# Patient Record
Sex: MALE | Race: WHITE | NOT HISPANIC OR LATINO | Employment: FULL TIME | URBAN - METROPOLITAN AREA
[De-identification: names, ages, dates, MRNs, and addresses within clinical notes are randomized per-mention and may not be internally consistent; named-entity substitution may affect disease eponyms.]

---

## 2018-06-19 ENCOUNTER — OFFICE VISIT (OUTPATIENT)
Dept: FAMILY MEDICINE | Facility: CLINIC | Age: 58
End: 2018-06-19
Payer: COMMERCIAL

## 2018-06-19 VITALS
OXYGEN SATURATION: 97 % | RESPIRATION RATE: 17 BRPM | HEIGHT: 67 IN | SYSTOLIC BLOOD PRESSURE: 130 MMHG | WEIGHT: 173.31 LBS | HEART RATE: 60 BPM | DIASTOLIC BLOOD PRESSURE: 76 MMHG | BODY MASS INDEX: 27.2 KG/M2 | TEMPERATURE: 99 F

## 2018-06-19 DIAGNOSIS — D22.9 NUMEROUS MOLES: ICD-10-CM
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
## 2018-06-19 PROCEDURE — 99999 PR PBB SHADOW E&M-NEW PATIENT-LVL III: CPT | Mod: PBBFAC,,, | Performed by: INTERNAL MEDICINE

## 2018-06-19 PROCEDURE — 99203 OFFICE O/P NEW LOW 30 MIN: CPT | Mod: S$GLB,,, | Performed by: INTERNAL MEDICINE
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
## 2018-06-20 ENCOUNTER — TELEPHONE (OUTPATIENT)
Dept: FAMILY MEDICINE | Facility: CLINIC | Age: 58
End: 2018-06-20

## 2018-06-20 NOTE — TELEPHONE ENCOUNTER
----- Message from Bibiana Ramey sent at 6/20/2018 10:04 AM CDT -----  Contact: Self  Pt states he was seen on yesterday and his neck pain has gotten worse. Pt can be reached @ [phone].
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
## 2018-06-21 ENCOUNTER — TELEPHONE (OUTPATIENT)
Dept: FAMILY MEDICINE | Facility: CLINIC | Age: 58
End: 2018-06-21

## 2018-06-21 ENCOUNTER — HOSPITAL ENCOUNTER (OUTPATIENT)
Dept: RADIOLOGY | Facility: HOSPITAL | Age: 58
Discharge: HOME | End: 2018-06-21
Attending: NURSE PRACTITIONER
Payer: COMMERCIAL

## 2018-06-21 ENCOUNTER — OFFICE VISIT (OUTPATIENT)
Dept: FAMILY MEDICINE | Facility: CLINIC | Age: 58
End: 2018-06-21
Payer: COMMERCIAL

## 2018-06-21 VITALS
DIASTOLIC BLOOD PRESSURE: 62 MMHG | OXYGEN SATURATION: 97 % | WEIGHT: 173 LBS | HEART RATE: 80 BPM | SYSTOLIC BLOOD PRESSURE: 102 MMHG | BODY MASS INDEX: 27.15 KG/M2 | TEMPERATURE: 98 F | HEIGHT: 67 IN

## 2018-06-21 DIAGNOSIS — M62.838 CERVICAL PARASPINAL MUSCLE SPASM: ICD-10-CM
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
## 2018-06-21 DIAGNOSIS — S16.1XXD STRAIN OF NECK MUSCLE, SUBSEQUENT ENCOUNTER: Primary | ICD-10-CM

## 2018-06-21 DIAGNOSIS — S16.1XXD STRAIN OF NECK MUSCLE, SUBSEQUENT ENCOUNTER: ICD-10-CM

## 2018-06-21 PROCEDURE — 3008F BODY MASS INDEX DOCD: CPT | Mod: CPTII,S$GLB,, | Performed by: NURSE PRACTITIONER

## 2018-06-21 PROCEDURE — 72040 X-RAY EXAM NECK SPINE 2-3 VW: CPT | Mod: TC,FY,PO

## 2018-06-21 PROCEDURE — 99999 PR PBB SHADOW E&M-EST. PATIENT-LVL IV: CPT | Mod: PBBFAC,,, | Performed by: NURSE PRACTITIONER

## 2018-06-21 PROCEDURE — 99214 OFFICE O/P EST MOD 30 MIN: CPT | Mod: S$GLB,,, | Performed by: NURSE PRACTITIONER

## 2018-06-21 PROCEDURE — 72040 X-RAY EXAM NECK SPINE 2-3 VW: CPT | Mod: 26,,, | Performed by: RADIOLOGY

## 2018-06-21 RX ORDER — CYCLOBENZAPRINE HCL 5 MG
5 TABLET ORAL NIGHTLY
Qty: 15 TABLET | Refills: 0 | Status: SHIPPED | OUTPATIENT
Start: 2018-06-21 | End: 2018-07-01

## 2018-06-21 NOTE — TELEPHONE ENCOUNTER
Patient in clinic for x-ray requesting a work note for 6/20-6/22 as he forgot to request it during his OV.  He is also requesting that the note state he is on light duty.  Patient would like to be notified when the note is ready for pickup.  Please advise.
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
## 2018-06-21 NOTE — LETTER
June 22, 2018      [facility]  [address] [facility]  [place] [id number]  Phone: [phone]  Fax: [phone]       Patient: Ashwin Segura   YOB: 1960  Date of Visit: 06/21/2018    Please excuse 6/20/2018-6/22/2018    To Whom It May Concern:    Caden Segura  was at [facility] on 06/21/2018. He may return to work/school on 06/25/2018 with restrictions, light duty. If you have any questions or concerns, or if I can be of further assistance, please do not hesitate to contact me.    Sincerely,      Bre Hamilton NP

## 2018-06-21 NOTE — PATIENT INSTRUCTIONS
Neck Spasm     A spasm of the neck muscles can happen after a sudden awkward neck movement. Sleeping with your neck in a crooked position can also cause spasm. Some people respond to emotional stress by tensing the muscles of their neck, shoulders, and upper back. If neck spasm lasts long enough, it can cause headache.  The treatment described below will usually help the pain to go away in 5 to 7 days. Pain that continues may need further evaluation or other types of treatment such as physical therapy.  Home care  · Rest and relax the muscles. Use a comfortable pillow that supports the head and keeps the spine in a neutral position. The position of the head should not be tilted forward or backward. A rolled up towel may help for a custom fit.  · Some people find relief with heat. Heat can be applied with either a warm shower or bath or a moist towel heated in the microwave and massage. Others prefer cold packs. You can make an ice pack by filling a plastic bag that seals at the top with ice cubes or crushed ice and then wrapping it with a thin towel. Try both and use the method that feels best for 15 to 20 minutes, several times a day.  · Whether using ice or heat, be careful that you do not injure your skin. Never put ice directly on the skin. Always wrap the ice in a towel or other type of cloth. This is very important, especially in people with poor skin sensations.  · Try to reduce your stress level. Emotional stress can lead to neck muscle tension and get in the way of or delay the healing process.  · You may use over-the-counter pain medicine to control pain, unless another medicine was prescribed.If you have chronic liver or kidney disease or ever had a stomach ulcer or GI bleeding, talk with your healthcare provider before using these medicines.  Follow-up care  Follow up with your healthcare provider if your symptoms do not show signs of improvement after one week. Physical therapy or further tests may be  needed.  If X-rays, CT scans, or MRI scans were taken, you will be told of any new findings that may affect your care.  Call 911  Call 911 if you have:  · Sudden weakness or numbness in one or both arms  · Neck swelling, difficulty or painful swallowing  · Difficulty breathing  · Chest pain  When to seek medical advice  Call your healthcare provider right away if any of these occur:  · Pain becomes worse or spreads into one or both arms  · Increasing headache with nausea or vomiting  · Fever of 100.4°F (38°C) or above lasting for 24 to 48 hours  Date Last Reviewed: 11/21/2015  © 5480-3918 [employer]. [address] [place] [address], [place] [address]. All rights reserved. This information is not intended as a substitute for professional medical care. Always follow your healthcare professional's instructions.

## 2018-06-22 DIAGNOSIS — Z11.59 NEED FOR HEPATITIS C SCREENING TEST: ICD-10-CM

## 2018-06-22 DIAGNOSIS — Z12.11 COLON CANCER SCREENING: ICD-10-CM

## 2018-06-22 NOTE — TELEPHONE ENCOUNTER
Also, please let the patient know the x-ray does not show an acute fracture or dislocation of the bones in the neck. It does show chronic degenerative changes.    Thanks!  GELY HaleC

## 2018-06-27 ENCOUNTER — TELEPHONE (OUTPATIENT)
Dept: ADMINISTRATIVE | Facility: HOSPITAL | Age: 58
End: 2018-06-27

## 2018-07-02 ENCOUNTER — OFFICE VISIT (OUTPATIENT)
Dept: FAMILY MEDICINE | Facility: CLINIC | Age: 58
End: 2018-07-02
Payer: COMMERCIAL

## 2018-07-02 VITALS
DIASTOLIC BLOOD PRESSURE: 70 MMHG | OXYGEN SATURATION: 96 % | WEIGHT: 169.75 LBS | SYSTOLIC BLOOD PRESSURE: 130 MMHG | HEIGHT: 67 IN | TEMPERATURE: 99 F | BODY MASS INDEX: 26.64 KG/M2 | RESPIRATION RATE: 17 BRPM | HEART RATE: 62 BPM

## 2018-07-02 DIAGNOSIS — K64.9 HEMORRHOIDS, UNSPECIFIED HEMORRHOID TYPE: ICD-10-CM

## 2018-07-02 DIAGNOSIS — M62.838 CERVICAL PARASPINAL MUSCLE SPASM: Primary | ICD-10-CM

## 2018-07-02 PROCEDURE — 99999 PR PBB SHADOW E&M-EST. PATIENT-LVL IV: CPT | Mod: PBBFAC,,, | Performed by: INTERNAL MEDICINE

## 2018-07-02 PROCEDURE — 99213 OFFICE O/P EST LOW 20 MIN: CPT | Mod: 25,S$GLB,, | Performed by: INTERNAL MEDICINE

## 2018-07-02 PROCEDURE — 96372 THER/PROPH/DIAG INJ SC/IM: CPT | Mod: S$GLB,,, | Performed by: INTERNAL MEDICINE

## 2018-07-02 PROCEDURE — 3008F BODY MASS INDEX DOCD: CPT | Mod: CPTII,S$GLB,, | Performed by: INTERNAL MEDICINE

## 2018-07-02 RX ORDER — SILDENAFIL CITRATE 20 MG/1
TABLET ORAL
Refills: 2 | COMMUNITY
Start: 2018-06-26 | End: 2022-04-08

## 2018-07-02 RX ORDER — LIDOCAINE 50 MG/G
OINTMENT TOPICAL
Qty: 30 G | Refills: 0 | Status: SHIPPED | OUTPATIENT
Start: 2018-07-02

## 2018-07-02 RX ORDER — KETOROLAC TROMETHAMINE 30 MG/ML
30 INJECTION, SOLUTION INTRAMUSCULAR; INTRAVENOUS
Status: COMPLETED | OUTPATIENT
Start: 2018-07-02 | End: 2018-07-02

## 2018-07-02 RX ORDER — IBUPROFEN 200 MG
200 TABLET ORAL EVERY 6 HOURS PRN
COMMUNITY
End: 2018-08-13

## 2018-07-02 RX ORDER — HYDROCORTISONE 25 MG/G
CREAM TOPICAL 2 TIMES DAILY
Qty: 20 G | Refills: 0 | Status: SHIPPED | OUTPATIENT
Start: 2018-07-02 | End: 2022-04-08

## 2018-07-02 RX ORDER — TRIAMCINOLONE ACETONIDE 40 MG/ML
40 INJECTION, SUSPENSION INTRA-ARTICULAR; INTRAMUSCULAR
Status: COMPLETED | OUTPATIENT
Start: 2018-07-02 | End: 2018-07-02

## 2018-07-02 RX ADMIN — TRIAMCINOLONE ACETONIDE 40 MG: 40 INJECTION, SUSPENSION INTRA-ARTICULAR; INTRAMUSCULAR at 04:07

## 2018-07-02 RX ADMIN — KETOROLAC TROMETHAMINE 30 MG: 30 INJECTION, SOLUTION INTRAMUSCULAR; INTRAVENOUS at 04:07

## 2018-07-02 NOTE — PROGRESS NOTES
HISTORY OF PRESENT ILLNESS:  Ashwin Segura Jr. is a 57 y.o. male who presents to the clinic today for Neck Pain    While working bending head down, pain in the back of neck into shoulder/upper back area.  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  Has been back at the gym but does stretching.  Denies numbness into fingers or hands, no weakness.    Hemorrhoids - ongoing for some time; denies constipation.  Noticed increased irritation recently, occasional blood streaks with stool.    PAST MEDICAL HISTORY:  History reviewed. No pertinent past medical history.    PAST SURGICAL HISTORY:  Past Surgical History:   Procedure Laterality Date    APPENDECTOMY         SOCIAL HISTORY:  Social History     Social History    Marital status:      Spouse name: N/A    Number of children: N/A    Years of education: N/A     Occupational History    Not on file.     Social History Main Topics    Smoking status: Never Smoker    Smokeless tobacco: Not on file    Alcohol use Not on file    Drug use: Unknown    Sexual activity: Not on file     Other Topics Concern    Not on file     Social History Narrative    No narrative on file       FAMILY HISTORY:  Family History   Problem Relation Age of Onset    Cancer Mother     Heart disease Father        ALLERGIES AND MEDICATIONS: updated and reviewed.  Review of patient's allergies indicates:  No Known Allergies  Medication List with Changes/Refills   Current Medications    IBUPROFEN (ADVIL,MOTRIN) 200 MG TABLET    Take 200 mg by mouth every 6 (six) hours as needed for Pain.    SILDENAFIL (REVATIO) 20 MG TAB    TAKE 1 TO 3 TABLETS BY MOUTH ONE HOUR PRIOR TO RELATIONS          CARE TEAM:  Patient Care Team:  Ronan Gan MD as PCP - General (Internal Medicine)         REVIEW OF SYSTEMS:  Review of Systems   Constitutional: Negative for chills and fatigue.   Respiratory: Negative for cough and shortness of breath.    Gastrointestinal: Positive for blood in  "stool. Negative for abdominal pain and constipation.   Musculoskeletal: Positive for neck pain and neck stiffness.   Neurological: Negative for weakness and numbness.         PHYSICAL EXAM:   Vitals:    07/02/18 1538   BP: 130/70   Pulse: 62   Resp: 17   Temp: 99.2 °F (37.3 °C)     Weight: 77 kg (169 lb 12.1 oz)   Height: 5' 7" (170.2 cm)   Body mass index is 26.59 kg/m².     General appearance - alert, well appearing, and in no distress  Mental status - normal mood, behavior, speech, dress, motor activity, and thought processes  Neck - supple, no significant adenopathy  Chest - clear to auscultation, no wheezes, rales or rhonchi, symmetric air entry  Heart - normal rate and regular rhythm  Abdomen - soft, nontender, nondistended, no masses or organomegaly  no rebound tenderness noted  Musculoskeletal - no joint tenderness, deformity or swelling, no muscular tenderness noted, full range of motion without pain      ASSESSMENT AND PLAN:  1. Cervical paraspinal muscle spasm  - ketorolac injection 30 mg; Inject 1 mL (30 mg total) into the muscle one time.  - triamcinolone acetonide injection 40 mg; Inject 1 mL (40 mg total) into the muscle one time.  - Ambulatory Referral to Physical/Occupational Therapy    2. Hemorrhoids, unspecified hemorrhoid type  - hydrocortisone 2.5 % cream; Apply topically 2 (two) times daily. for 10 days  Dispense: 20 g; Refill: 0  - lidocaine (XYLOCAINE) 5 % Oint ointment; Apply topically as needed.  Dispense: 30 g; Refill: 0  - Ambulatory referral to General Surgery     Follow up as needed.  "

## 2018-07-02 NOTE — PROGRESS NOTES
Patient tolerate Kenalog 40 mg and Toradol 30 mg IM injection. Patient advise to wait 15 min after injection  for assessment of any posssible side effects.

## 2018-07-03 ENCOUNTER — TELEPHONE (OUTPATIENT)
Dept: FAMILY MEDICINE | Facility: CLINIC | Age: 58
End: 2018-07-03

## 2018-07-03 NOTE — TELEPHONE ENCOUNTER
----- Message from Rachelle Benton sent at 7/3/2018  9:11 AM CDT -----  Contact: [facility]  Rep calling to clarify directions on --- lidocaine (XYLOCAINE) 5 % Oint ointment---   [phone]

## 2018-07-03 NOTE — TELEPHONE ENCOUNTER
Spoke with Jesus Reyes with clarity of directions for the lidocaine after speaking with Dr Gan and it to be applied twice daily as needed.

## 2018-07-09 ENCOUNTER — TELEPHONE (OUTPATIENT)
Dept: FAMILY MEDICINE | Facility: CLINIC | Age: 58
End: 2018-07-09

## 2018-07-09 NOTE — TELEPHONE ENCOUNTER
Attempted to contact pt. Regarding an appointment for neck pain. No ans. Left message on VM to call office.

## 2018-07-09 NOTE — TELEPHONE ENCOUNTER
----- Message from Humaira Lloyd sent at 7/9/2018  9:02 AM CDT -----  Contact: self  Patient is still having neck pain and would like an appointment but theres no openings. Please call back at [phone]

## 2018-07-10 ENCOUNTER — TELEPHONE (OUTPATIENT)
Dept: FAMILY MEDICINE | Facility: CLINIC | Age: 58
End: 2018-07-10

## 2018-07-10 NOTE — TELEPHONE ENCOUNTER
----- Message from Sid Call sent at 7/10/2018 10:47 AM CDT -----  Contact: Ashwin [phone]  Patient is retunring a call back to the staff in regards to an MRI. He is having problems with his neck. Please call at your earliest convenience.
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
## 2018-07-11 NOTE — TELEPHONE ENCOUNTER
Spoke with patient and he informed me that on his last visit with provider; she informed him if he was having any something mention in his office visit to give a return call for a possible MRI being done. Patient said that today he had pain in neck and headache on a pain scale-5. He said that he went back to work and with work activities his pain is a 8 and it from head, neck, to right shoulder. Please advise. Patient was informed that provider will be out of clinic until 7/16/18.

## 2018-07-12 ENCOUNTER — TELEPHONE (OUTPATIENT)
Dept: FAMILY MEDICINE | Facility: CLINIC | Age: 58
End: 2018-07-12

## 2018-07-12 NOTE — TELEPHONE ENCOUNTER
----- Message from Carmen Strong sent at 7/12/2018  3:43 PM CDT -----  Contact: Self   Patient is asking the office to give him a call. [phone].

## 2018-07-12 NOTE — TELEPHONE ENCOUNTER
Talked to pt regarding pain in neck. Pt was advised that if he's still having (8 pain level) he should seek Urgent care or ED. Pt refused and said pain level is now at a 4 or 5, but he must return to work soon and wants MRI done. Pt. Also stated that he talked to Dr. Gan during his OV and his understanding was that Dr. Gan would submit an referral if he was still having pain.

## 2018-07-13 ENCOUNTER — HOSPITAL ENCOUNTER (OUTPATIENT)
Dept: RADIOLOGY | Facility: HOSPITAL | Age: 58
Discharge: HOME | End: 2018-07-13
Attending: INTERNAL MEDICINE
Payer: COMMERCIAL

## 2018-07-13 DIAGNOSIS — M54.2 NECK PAIN: Primary | ICD-10-CM

## 2018-07-13 DIAGNOSIS — M54.2 NECK PAIN: ICD-10-CM

## 2018-07-13 PROCEDURE — 72141 MRI NECK SPINE W/O DYE: CPT | Mod: TC

## 2018-07-13 PROCEDURE — 72141 MRI NECK SPINE W/O DYE: CPT | Mod: 26,,, | Performed by: RADIOLOGY

## 2018-07-16 ENCOUNTER — OFFICE VISIT (OUTPATIENT)
Dept: FAMILY MEDICINE | Facility: CLINIC | Age: 58
End: 2018-07-16
Payer: COMMERCIAL

## 2018-07-16 VITALS
DIASTOLIC BLOOD PRESSURE: 90 MMHG | BODY MASS INDEX: 26.59 KG/M2 | HEART RATE: 72 BPM | TEMPERATURE: 99 F | OXYGEN SATURATION: 95 % | SYSTOLIC BLOOD PRESSURE: 140 MMHG | WEIGHT: 169.75 LBS

## 2018-07-16 DIAGNOSIS — M54.2 NECK PAIN: Primary | ICD-10-CM

## 2018-07-16 PROCEDURE — 99213 OFFICE O/P EST LOW 20 MIN: CPT | Mod: S$GLB,,, | Performed by: INTERNAL MEDICINE

## 2018-07-16 PROCEDURE — 99999 PR PBB SHADOW E&M-EST. PATIENT-LVL III: CPT | Mod: PBBFAC,,, | Performed by: INTERNAL MEDICINE

## 2018-07-16 PROCEDURE — 3008F BODY MASS INDEX DOCD: CPT | Mod: CPTII,S$GLB,, | Performed by: INTERNAL MEDICINE

## 2018-07-16 RX ORDER — TIZANIDINE 2 MG/1
2 TABLET ORAL EVERY 8 HOURS PRN
Qty: 30 TABLET | Refills: 0 | Status: SHIPPED | OUTPATIENT
Start: 2018-07-16 | End: 2018-07-26

## 2018-07-16 RX ORDER — MELOXICAM 7.5 MG/1
7.5 TABLET ORAL DAILY
Qty: 15 TABLET | Refills: 0 | Status: SHIPPED | OUTPATIENT
Start: 2018-07-16 | End: 2022-04-08
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
## 2018-07-17 NOTE — TELEPHONE ENCOUNTER
Attempted to contact pt. Regarding MRI. Dr. Gan has set the Order for MRI and wants pt. To set up a follow up visit after MRI is completed. No answer left message on VM to call office.

## 2018-07-20 ENCOUNTER — TELEPHONE (OUTPATIENT)
Dept: ENDOCRINOLOGY | Facility: CLINIC | Age: 58
End: 2018-07-20

## 2018-07-20 ENCOUNTER — TELEPHONE (OUTPATIENT)
Dept: PAIN MEDICINE | Facility: CLINIC | Age: 58
End: 2018-07-20

## 2018-07-20 NOTE — TELEPHONE ENCOUNTER
Reminded patient of Pain Management appointment scheduled for Monday at 11.30a with Dr. Mendoza- verbal confirmation received.  Location information also provided.

## 2018-08-06 ENCOUNTER — CLINICAL SUPPORT (OUTPATIENT)
Dept: REHABILITATION | Facility: OTHER | Age: 58
End: 2018-08-06
Payer: COMMERCIAL

## 2018-08-06 DIAGNOSIS — M62.89 MUSCLE TIGHTNESS: ICD-10-CM

## 2018-08-06 DIAGNOSIS — M54.2 NECK PAIN ON LEFT SIDE: ICD-10-CM

## 2018-08-06 DIAGNOSIS — R29.3 POSTURAL IMBALANCE: ICD-10-CM

## 2018-08-06 PROCEDURE — 97161 PT EVAL LOW COMPLEX 20 MIN: CPT | Mod: PN

## 2018-08-06 PROCEDURE — 97110 THERAPEUTIC EXERCISES: CPT | Mod: PN

## 2018-08-06 PROCEDURE — 97140 MANUAL THERAPY 1/> REGIONS: CPT | Mod: PN
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
## 2018-08-13 ENCOUNTER — OFFICE VISIT (OUTPATIENT)
Dept: URGENT CARE | Facility: CLINIC | Age: 58
End: 2018-08-13
Payer: COMMERCIAL

## 2018-08-13 VITALS
SYSTOLIC BLOOD PRESSURE: 140 MMHG | BODY MASS INDEX: 26.53 KG/M2 | WEIGHT: 169 LBS | RESPIRATION RATE: 18 BRPM | HEART RATE: 66 BPM | DIASTOLIC BLOOD PRESSURE: 82 MMHG | HEIGHT: 67 IN | OXYGEN SATURATION: 98 % | TEMPERATURE: 98 F

## 2018-08-13 DIAGNOSIS — S61.211A LACERATION OF LEFT INDEX FINGER WITHOUT FOREIGN BODY WITHOUT DAMAGE TO NAIL, INITIAL ENCOUNTER: Primary | ICD-10-CM

## 2018-08-13 PROCEDURE — 90715 TDAP VACCINE 7 YRS/> IM: CPT | Mod: S$GLB,,, | Performed by: FAMILY MEDICINE

## 2018-08-13 PROCEDURE — 99203 OFFICE O/P NEW LOW 30 MIN: CPT | Mod: 25,S$GLB,, | Performed by: FAMILY MEDICINE

## 2018-08-13 PROCEDURE — 90471 IMMUNIZATION ADMIN: CPT | Mod: S$GLB,,, | Performed by: FAMILY MEDICINE

## 2018-08-13 PROCEDURE — 12042 INTMD RPR N-HF/GENIT2.6-7.5: CPT | Mod: S$GLB,,, | Performed by: FAMILY MEDICINE

## 2018-08-13 RX ORDER — IBUPROFEN 800 MG/1
800 TABLET ORAL EVERY 8 HOURS PRN
Qty: 60 TABLET | Refills: 2 | Status: SHIPPED | OUTPATIENT
Start: 2018-08-13 | End: 2019-08-13

## 2018-08-13 RX ORDER — CEPHALEXIN 500 MG/1
500 CAPSULE ORAL EVERY 8 HOURS
Qty: 30 CAPSULE | Refills: 0 | Status: SHIPPED | OUTPATIENT
Start: 2018-08-13 | End: 2018-08-23

## 2018-08-13 RX ORDER — MUPIROCIN 20 MG/G
OINTMENT TOPICAL
Qty: 22 G | Refills: 1 | Status: SHIPPED | OUTPATIENT
Start: 2018-08-13 | End: 2020-08-24

## 2018-08-13 RX ORDER — OXYCODONE AND ACETAMINOPHEN 5; 325 MG/1; MG/1
1 TABLET ORAL EVERY 12 HOURS PRN
Qty: 7 TABLET | Refills: 0 | Status: SHIPPED | OUTPATIENT
Start: 2018-08-13 | End: 2022-04-08

## 2018-08-13 NOTE — PROGRESS NOTES
"Subjective:       Patient ID: Ashwin Segura Jr. is a 57 y.o. male.    Vitals:  height is 5' 7" (1.702 m) and weight is 76.7 kg (169 lb). His temperature is 98.4 °F (36.9 °C). His blood pressure is 140/82 (abnormal) and his pulse is 66. His respiration is 18 and oxygen saturation is 98%.     Chief Complaint: Laceration    Laceration    The incident occurred 1 to 3 hours ago. The laceration is located on the left hand. The laceration is 3 cm in size. The laceration mechanism was a metal edge. The pain is at a severity of 2/10. The pain is mild. The pain has been constant since onset. It is unknown if a foreign body is present. His tetanus status is out of date.     Review of Systems   Constitution: Negative for weakness and malaise/fatigue.   HENT: Negative for nosebleeds.    Cardiovascular: Negative for chest pain and syncope.   Respiratory: Negative for shortness of breath.    Musculoskeletal: Negative for back pain, joint pain and neck pain.   Gastrointestinal: Negative for abdominal pain.   Genitourinary: Negative for hematuria.   Neurological: Negative for dizziness and numbness.   All other systems reviewed and are negative.      Objective:      Physical Exam   Constitutional: He is oriented to person, place, and time. He appears well-developed.   HENT:   Head: Normocephalic.   Nose: Nose normal.   Mouth/Throat: Oropharynx is clear and moist.   Eyes: Conjunctivae and EOM are normal. Pupils are equal, round, and reactive to light.   Cardiovascular: Normal rate and regular rhythm.   Pulmonary/Chest: Breath sounds normal.   Abdominal: Bowel sounds are normal.   Musculoskeletal:        Right hand: Normal.        Left hand: He exhibits tenderness and laceration. He exhibits normal range of motion and no swelling. Decreased sensation (minimal near laceration but distally intact) noted. Normal strength noted.   Neurological: He is alert and oriented to person, place, and time.   Skin: Laceration and lesion noted. "   There is a single 5.2cm laceration, subcutaneous, irregular, located on the left, index finger around PIP.          Assessment:       1. Laceration of left index finger without foreign body without damage to nail, initial encounter        Plan:         Laceration of left index finger without foreign body without damage to nail, initial encounter  -     Laceration Repair    Other orders  -     (In Office Administered) Tdap Vaccine  -     mupirocin (BACTROBAN) 2 % ointment; Apply to affected area 3 times daily  Dispense: 22 g; Refill: 1  -     cephALEXin (KEFLEX) 500 MG capsule; Take 1 capsule (500 mg total) by mouth every 8 (eight) hours. for 10 days  Dispense: 30 capsule; Refill: 0  -     ibuprofen (ADVIL,MOTRIN) 800 MG tablet; Take 1 tablet (800 mg total) by mouth every 8 (eight) hours as needed for Pain.  Dispense: 60 tablet; Refill: 2  -     oxyCODONE-acetaminophen (PERCOCET) 5-325 mg per tablet; Take 1 tablet by mouth every 12 (twelve) hours as needed for Pain.  Dispense: 7 tablet; Refill: 0

## 2018-08-13 NOTE — PATIENT INSTRUCTIONS
Extremity Laceration: Sutures, Staples, or Tape  A laceration is a cut through the skin. If it is deep, it may require stitches (sutures) or staples to close so it can heal. Minor cuts may be treated with surgical tape closures.   X-rays may be done if something may have entered the skin through the cut. You may also need a tetanus shot if you are not up to date on this vaccination.  Home care  · Follow the health care providers instructions on how to care for the cut.  · Wash your hands with soap and warm water before and after caring for your wound. This is to help prevent infection.  · Keep the wound clean and dry. If a bandage was applied and it becomes wet or dirty, replace it. Otherwise, leave it in place for the first 24 hours, then change it once a day or as directed.  · If sutures or staples were used, clean the wound daily:  · After removing the bandage, wash the area with soap and water. Use a wet cotton swab to loosen and remove any blood or crust that forms.  · After cleaning, keep the wound clean and dry. Talk with your doctor before applying any antibiotic ointment to the wound. Reapply the bandage.  · You may remove the bandage to shower as usual after the first 24 hours, but do not soak the area in water (no swimming) until the stitches or staples are removed.  · If surgical tape closures were used, keep the area clean and dry. If it becomes wet, blot it dry with a towel.  · The doctor may prescribe an antibiotic cream or ointment to prevent infection. Do not stop taking this medication until you have finished the prescribed course or the doctor tells you to stop. The doctor may also prescribe medications for pain. Follow the doctors instructions for taking these medications.  · Avoid activities that may reopen your wound.  Follow-up care  Follow up with your health care provider. Most skin wounds heal within ten days. However, an infection may sometimes occur despite proper treatment.  Therefore, check the wound daily for the signs of infection listed below. Stitches and staples should be removed within 7-14 days. If surgical tape closures were used, you may remove them after 10 days if they have not fallen off by then.   When to seek medical advice  Call your health care provider right away if any of these occur:  · Wound bleeding not controlled by direct pressure  · Signs of infection, including increasing pain in the wound, increasing wound redness or swelling, or pus or bad odor coming from the wound  · Fever of 100.4°F (38ºC) or higher or as directed by your healthcare provider  · Stitches or staples come apart or fall out or surgical tape falls off before 7 days  · Wound edges re-open  · Wound changes colors  · Numbness around the wound   · Decreased movement around the injured area  Date Last Reviewed: 6/14/2015  © 4467-3370 [employer]. [address] [place] [address], [place] [address]. All rights reserved. This information is not intended as a substitute for professional medical care. Always follow your healthcare professional's instructions.

## 2018-08-13 NOTE — PROCEDURES
"Laceration Repair  Date/Time: 8/13/2018 2:21 PM  Performed by: Pro Young MD  Authorized by: Pro Young MD   Consent Done: Yes  Consent: Verbal consent obtained.  Risks and benefits: risks, benefits and alternatives were discussed  Consent given by: patient  Patient understanding: patient states understanding of the procedure being performed  Patient identity confirmed: verbally with patient  Time out: Immediately prior to procedure a "time out" was called to verify the correct patient, procedure, equipment, support staff and site/side marked as required.  Body area: upper extremity  Location details: left index finger  Laceration length: 5.2 cm  Foreign bodies: no foreign bodies  Tendon involvement: none  Nerve involvement: none  Vascular damage: no  Anesthesia: digital block    Anesthesia:  Local Anesthetic: lidocaine 1% without epinephrine  Anesthetic total: 10 mL  Patient sedated: no  Preparation: Patient was prepped and draped in the usual sterile fashion.  Irrigation solution: saline  Irrigation method: syringe  Amount of cleaning: extensive  Debridement: none  Degree of undermining: none  Skin closure: 5-0 nylon  Number of sutures: 14  Technique: simple  Approximation: close  Approximation difficulty: simple  Dressing: 4x4 sterile gauze, antibiotic ointment, non-stick sterile dressing and pressure dressing  Patient tolerance: Patient tolerated the procedure well with no immediate complications        "

## 2018-08-23 ENCOUNTER — OFFICE VISIT (OUTPATIENT)
Dept: URGENT CARE | Facility: CLINIC | Age: 58
End: 2018-08-23
Payer: COMMERCIAL

## 2018-08-23 VITALS
HEART RATE: 68 BPM | TEMPERATURE: 97 F | SYSTOLIC BLOOD PRESSURE: 140 MMHG | BODY MASS INDEX: 26.53 KG/M2 | WEIGHT: 169 LBS | HEIGHT: 67 IN | OXYGEN SATURATION: 98 % | DIASTOLIC BLOOD PRESSURE: 80 MMHG

## 2018-08-23 DIAGNOSIS — Z48.02 VISIT FOR SUTURE REMOVAL: Primary | ICD-10-CM

## 2018-08-23 PROCEDURE — S0630 REMOVAL OF SUTURES: HCPCS | Mod: S$GLB,,, | Performed by: NURSE PRACTITIONER

## 2018-08-23 RX ORDER — CLINDAMYCIN HYDROCHLORIDE 300 MG/1
300 CAPSULE ORAL 3 TIMES DAILY
Qty: 30 CAPSULE | Refills: 0 | Status: SHIPPED | OUTPATIENT
Start: 2018-08-23 | End: 2018-09-02

## 2018-08-23 NOTE — PROCEDURES
Suture Removal  Date/Time: 8/23/2018 5:31 PM  Location procedure was performed: [facility]  Performed by: Rob Lin NP  Authorized by: Rob Lin NP   Pre-operative diagnosis: Laceration  Post-operative diagnosis: suture removal  Body area: upper extremity  Location details: right thumb  Description of findings: No well-approximated.  Not fully healed   Wound Appearance: clean, tender and no drainage  Sutures Removed: 14  Post-removal: no dressing applied  Facility: sutures placed in this facility  Complications: No  Estimated blood loss (mL): 0  Patient tolerance: Patient tolerated the procedure well with no immediate complications

## 2018-08-23 NOTE — PATIENT INSTRUCTIONS
"  Suture or Staple Removal  You were seen today for a suture or staple removal. Your wound is healing as expected. The wound has healed well enough that the sutures or staples can be removed. The wound will continue to heal for the next few months.  At this time there is no sign of infection.   Home care  · If you have pain, take pain medicine as advised by your healthcare provider.   · Keep your wound clean and protected by covering it with a bandage for the next week or so.   · Wash your hands with soap and warm water before and after caring for your wound. This helps prevent infection.  · Clean the wound gently with soap and warm water daily or as directed by your childs health care provider. Do not use iodine, alcohol, or other cleansers on the wound.  Gently pat it dry. Put on a new bandage, if needed. Do not reuse bandages.  · If the area gets wet, gently pat it dry with a clean cloth. Replace the wet bandage with a dry one.  · Check the wound daily for signs of infection. (These are listed under "When to seek medical advice" below.)  · You may shower and bathe as usual. Swimming is now permitted.  Follow-up care  Follow up with your healthcare provider as advised.  When to seek medical advice   Call your healthcare provider if any of the following occur:  · Wound reopens or bleeds  · Signs of an infection, such as:  ¨ Increasing redness or swelling around the wound  ¨ Increased warmth from the wound  ¨ Worsening pain  ¨ Red streaking lines away from the wound  ¨ Fluid draining from the wound  · Fever of 100.4°F (38°C) or higher, or as directed by your child's healthcare provider  Date Last Reviewed: 9/27/2015  © 0730-7328 [employer]. [address] [place] [address], [place] [address]. All rights reserved. This information is not intended as a substitute for professional medical care. Always follow your healthcare professional's instructions.      Please return here or go to the Emergency Department for " any concerns or worsening of condition.  If you were prescribed antibiotics, please take them to completion.  If you were prescribed a narcotic medication, do not drive or operate heavy equipment or machinery while taking these medications.  Please follow up with your primary care doctor or specialist as needed.    If you  smoke, please stop smoking.

## 2018-08-23 NOTE — PROGRESS NOTES
"Subjective:       Patient ID: Ashwin Segura Jr. is a 57 y.o. male.    Vitals:  height is 5' 7" (1.702 m) and weight is 76.7 kg (169 lb). His temperature is 97.2 °F (36.2 °C). His blood pressure is 140/80 (abnormal) and his pulse is 68. His oxygen saturation is 98%.     Chief Complaint: Suture / Staple Removal    Pt here to have suture removal       Suture / Staple Removal   The sutures were placed 11 to 14 days ago. He tried oral antibiotics and antibiotic ointment use since the wound repair. The treatment provided moderate relief.     Review of Systems   Constitution: Negative for chills and fever.   HENT: Negative for sore throat.    Eyes: Negative for blurred vision.   Cardiovascular: Negative for chest pain.   Respiratory: Negative for shortness of breath.    Skin: Negative for rash.   Musculoskeletal: Negative for back pain and joint pain.   Gastrointestinal: Negative for abdominal pain, diarrhea, nausea and vomiting.   Neurological: Negative for headaches.   Psychiatric/Behavioral: The patient is not nervous/anxious.        Objective:      Physical Exam   Constitutional: He is oriented to person, place, and time. He appears well-developed and well-nourished.   HENT:   Head: Normocephalic and atraumatic. Head is without abrasion, without contusion and without laceration.   Right Ear: External ear normal.   Left Ear: External ear normal.   Nose: Nose normal.   Mouth/Throat: Oropharynx is clear and moist.   Eyes: Conjunctivae, EOM and lids are normal. Pupils are equal, round, and reactive to light.   Neck: Trachea normal, full passive range of motion without pain and phonation normal. Neck supple.   Cardiovascular: Normal rate, regular rhythm and normal heart sounds.   Pulmonary/Chest: Effort normal and breath sounds normal. No stridor. No respiratory distress.   Musculoskeletal: Normal range of motion.   Neurological: He is alert and oriented to person, place, and time.   Skin: Skin is warm and dry. " "Capillary refill takes less than 2 seconds. Laceration noted. No abrasion, no bruising, no burn, no ecchymosis, no lesion and no rash noted. There is erythema.        KENISHA/partly healed.   Psychiatric: He has a normal mood and affect. His speech is normal and behavior is normal. Judgment and thought content normal. Cognition and memory are normal.   Nursing note and vitals reviewed.      Assessment:       1. Visit for suture removal        Plan:         Visit for suture removal    Other orders  -     clindamycin (CLEOCIN) 300 MG capsule; Take 1 capsule (300 mg total) by mouth 3 (three) times daily. for 10 days  Dispense: 30 capsule; Refill: 0    Removed 14 sutures.  Not well-approximated due to anatomy and open in some areas.  Pt works in dirty environment and requires additional abx coverage while it continues to heal.      Suture or Staple Removal  You were seen today for a suture or staple removal. Your wound is healing as expected. The wound has healed well enough that the sutures or staples can be removed. The wound will continue to heal for the next few months.  At this time there is no sign of infection.   Home care  · If you have pain, take pain medicine as advised by your healthcare provider.   · Keep your wound clean and protected by covering it with a bandage for the next week or so.   · Wash your hands with soap and warm water before and after caring for your wound. This helps prevent infection.  · Clean the wound gently with soap and warm water daily or as directed by your childs health care provider. Do not use iodine, alcohol, or other cleansers on the wound.  Gently pat it dry. Put on a new bandage, if needed. Do not reuse bandages.  · If the area gets wet, gently pat it dry with a clean cloth. Replace the wet bandage with a dry one.  · Check the wound daily for signs of infection. (These are listed under "When to seek medical advice" below.)  · You may shower and bathe as usual. Swimming is now " permitted.  Follow-up care  Follow up with your healthcare provider as advised.  When to seek medical advice   Call your healthcare provider if any of the following occur:  · Wound reopens or bleeds  · Signs of an infection, such as:  ¨ Increasing redness or swelling around the wound  ¨ Increased warmth from the wound  ¨ Worsening pain  ¨ Red streaking lines away from the wound  ¨ Fluid draining from the wound  · Fever of 100.4°F (38°C) or higher, or as directed by your child's healthcare provider  Date Last Reviewed: 9/27/2015 © 2000-2017 [employer]. [address] [place] [address]. All rights reserved. This information is not intended as a substitute for professional medical care. Always follow your healthcare professional's instructions.      Please return here or go to the Emergency Department for any concerns or worsening of condition.  If you were prescribed antibiotics, please take them to completion.  If you were prescribed a narcotic medication, do not drive or operate heavy equipment or machinery while taking these medications.  Please follow up with your primary care doctor or specialist as needed.    If you  smoke, please stop smoking.

## 2018-09-10 ENCOUNTER — TELEPHONE (OUTPATIENT)
Dept: FAMILY MEDICINE | Facility: CLINIC | Age: 58
End: 2018-09-10

## 2018-09-10 NOTE — TELEPHONE ENCOUNTER
Spoke with patient and he said that he is a  and he has been having neck pain with muscle tightness. Pain ranging on a scale of 5-6. He is requesting referral to Physical Therapy. Please advise.

## 2018-09-10 NOTE — TELEPHONE ENCOUNTER
----- Message from Janet Morocho sent at 9/10/2018 12:28 PM CDT -----  Contact: Self/[phone]  Patient called to request a referral for Physical Therapy. Thank you.

## 2018-09-11 DIAGNOSIS — M54.2 NECK PAIN: Primary | ICD-10-CM

## 2018-09-24 ENCOUNTER — CLINICAL SUPPORT (OUTPATIENT)
Dept: REHABILITATION | Facility: HOSPITAL | Age: 58
End: 2018-09-24
Attending: INTERNAL MEDICINE
Payer: COMMERCIAL

## 2018-09-24 DIAGNOSIS — R29.3 POSTURAL IMBALANCE: ICD-10-CM

## 2018-09-24 DIAGNOSIS — M54.2 PAINFUL CERVICAL RANGE OF MOTION: Primary | ICD-10-CM

## 2018-09-24 DIAGNOSIS — M62.89 MUSCLE TIGHTNESS: ICD-10-CM

## 2018-09-24 PROCEDURE — 97110 THERAPEUTIC EXERCISES: CPT | Mod: PN

## 2018-09-24 PROCEDURE — 97161 PT EVAL LOW COMPLEX 20 MIN: CPT | Mod: PN

## 2018-09-24 NOTE — PROGRESS NOTES
"See full Physical Therapy Evaluation in POC     Precautions: Standard    Evaluation Date: 9/24/2018  Visit # authorized: 20  Authorization period: 12/31/2018     Treatment:   UT stretch: 4 x 10"  LS stretch: 4 x 10"  Seated chin tucks: 10x 10"    Manual therapy:   10 min x STM/MFR to CSP/TSP with joint mobs and manual UT/LS/scalene stretching  10 min x preparation and application of kinesiotape for CSP paraspinal and UT inhibition. Patient received education regarding appropriate care and removal of Kinesiotape. Patient instructed in proper removal techniques if skin irritation occurs.    Short Term Goals (6Weeks):   1. Pt will report 20% reduction in pain of the cervical spine and LUE for ease with ADL's  2. PT will demonstrate 1/3 MMT improvement in periscapular strength for ease with upright posture  3. Pt will demonstrate improved cervical spine ROM in all directions by 5 degrees for ease with driving to MD appointments    Long Term Goals (12 Weeks):   1. Pt will report being independent with HEP for maintenance of improvements gained during therapy sessions  2. PT will report 50% reduction of pain of the neck and LUE for ease with donning upper body clothing   3. Pt will demonstrate full LUE and periscapular strength without the provocation of pain for ease with household chores  4. Pt will demonstrate appropriate upright posture without external cueing for ease with work related activities.         Plan     Cont PT 1-3 times a week for 12 weeks during the certification period (9/24/2018 - 12/24/2018) PN Due: (10/24/2018)           "
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
## 2018-09-28 ENCOUNTER — CLINICAL SUPPORT (OUTPATIENT)
Dept: REHABILITATION | Facility: HOSPITAL | Age: 58
End: 2018-09-28
Attending: INTERNAL MEDICINE
Payer: COMMERCIAL

## 2018-09-28 DIAGNOSIS — M54.2 NECK PAIN: ICD-10-CM

## 2018-09-28 DIAGNOSIS — M62.89 MUSCLE TIGHTNESS: Primary | ICD-10-CM

## 2018-09-28 DIAGNOSIS — M54.2 PAINFUL CERVICAL RANGE OF MOTION: ICD-10-CM

## 2018-09-28 DIAGNOSIS — R29.3 POSTURAL IMBALANCE: ICD-10-CM

## 2018-09-28 DIAGNOSIS — M54.2 NECK PAIN ON LEFT SIDE: ICD-10-CM

## 2018-09-28 PROCEDURE — 97110 THERAPEUTIC EXERCISES: CPT | Mod: PN

## 2018-09-28 NOTE — PROGRESS NOTES
"                                                    Physical Therapy Daily Note     Name: Ashwin Segura Jr.  Clinic Number: [id number]  Diagnosis:   Encounter Diagnoses   Name Primary?    Painful cervical range of motion     Postural imbalance     Neck pain     Muscle tightness Yes    Neck pain on left side      Physician: Ronan Gan MD  Precautions: STANDARD  Visit #: 2 of 20  OLSEN: 12/21/18  PTA Visit #: 1  Certification Period: 9/24/18 to 12/24/18 (PN due by 10/24/18)  Time In: 1312  Time Out: 1415  Total Treatment Time: 63 mins (1:1 with PTA for 30 mins)    Subjective     Patient reports: his neck hurts with turning and looking up.  Pain Scale: Ashwin rates pain on a scale of 0-10 to be 5 currently.    Objective     Ashwin received individual therapeutic exercises to develop strength, endurance, ROM, flexibility and posture for 53 minutes including:    Seated:   - Upper trap Stretch 4x10"   - Levator Scap Stretch 4x10"   - C- AROM in all planes within pain free range x15   - B shoulder ER with RTB x15   - Nerve Glides x15    Supine:   - C-Retractions x15   - C- Rotation with arm raises x15 reps   - Scapular Retractions x15 (progress next session)   - Horizontal Abduction with RTB x15 reps   - Pec Stretch over half bolster 3 minutes   - Manual therapy (see below)  Sidelying:    - Open Books x15          Ashwin received the following manual therapy techniques: Myofacial release, Soft tissue Mobilization and manual cervical stretches were applied to the: upper trap, levator scap, sub occipitals for 10 minutes using sambra.  The patient received the following direct contact modalities after being cleared for contraindications: NA    The patient received the following supervised modalities after being cleared for contradictions: NA    Written Home Exercises Provided: Upper trap, levator scap, nerve glides, cervical retractions  Pt demo good understanding of the education provided. Ashwin demonstrated good " return demonstration of activities.     Education provided re: pt educated on importance of HEP and proper technique and muscle activation with all theres.  Ashwin verbalized good understanding of education provided.   No spiritual or educational barriers to learning provided    Assessment     Patient tolerated treatment good today.  Displays discomfort with cervical stretches and ROM, however, within tolerable range.  Pt experienced relief with manual traction from PT last session.  Due to cervical stenosis via MRI, per chart, pt may benefit from mechanical traction.  Pt displays tightness throughout suboccipital region, levator scap, and upper trap region bilaterally.  Progress as tolerated by pt.  Pt with decrease in symptoms at end of treatment session.  This is a 58 y.o. male referred to outpatient physical therapy and presents with a medical diagnosis of the aforementioned above and demonstrates limitations as described in the problem list. Pt prognosis is Good. Pt will continue to benefit from skilled outpatient physical therapy to address the deficits listed in the problem list, provide pt/family education and to maximize pt's level of independence in the home and community environment.     Goals as follows:  Short Term Goals (6Weeks):   1. Pt will report 20% reduction in pain of the cervical spine and LUE for ease with ADL's  2. PT will demonstrate 1/3 MMT improvement in periscapular strength for ease with upright posture  3. Pt will demonstrate improved cervical spine ROM in all directions by 5 degrees for ease with driving to MD appointments     Long Term Goals (12 Weeks):   1. Pt will report being independent with HEP for maintenance of improvements gained during therapy sessions  2. PT will report 50% reduction of pain of the neck and LUE for ease with donning upper body clothing   3. Pt will demonstrate full LUE and periscapular strength without the provocation of pain for ease with household chores  4.  Pt will demonstrate appropriate upright posture without external cueing for ease with work related activities.          Plan     Certification Period: 9/24/18 to 12/24/18 (PN due by 10/24/18)    Continue with established Plan of Care towards PT goals.    Therapist: Cristel Fan, PTA  9/28/2018

## 2018-10-12 ENCOUNTER — CLINICAL SUPPORT (OUTPATIENT)
Dept: REHABILITATION | Facility: HOSPITAL | Age: 58
End: 2018-10-12
Attending: INTERNAL MEDICINE
Payer: COMMERCIAL

## 2018-10-12 DIAGNOSIS — R29.3 POSTURAL IMBALANCE: ICD-10-CM

## 2018-10-12 DIAGNOSIS — M54.2 PAINFUL CERVICAL RANGE OF MOTION: ICD-10-CM

## 2018-10-12 DIAGNOSIS — M54.2 NECK PAIN: ICD-10-CM

## 2018-10-12 PROCEDURE — 97140 MANUAL THERAPY 1/> REGIONS: CPT | Mod: PN,59

## 2018-10-12 PROCEDURE — 97012 MECHANICAL TRACTION THERAPY: CPT | Mod: PN

## 2018-10-12 PROCEDURE — 97110 THERAPEUTIC EXERCISES: CPT | Mod: PN

## 2018-10-12 NOTE — PROGRESS NOTES
Physical Therapy Daily Note     Name: Ashwin Segura Jr.  Clinic Number: [id number]  Diagnosis:   Encounter Diagnoses   Name Primary?    Painful cervical range of motion     Postural imbalance     Neck pain      Physician: Ronan Gan MD  Precautions: STANDARD  Visit #: 3 of 20  OLSEN: 12/21/18  PTA Visit #: 0  Certification Period: 9/24/18 to 12/24/18 (PN due by 10/24/18)  Time In: 1100  Time Out: 1200  Total Treatment Time: 60  mins (1:1 with PT for duration mins)    Subjective     Patient reports: his neck hurts with turning and looking up. Patient says that he has to switch jobs because the pain in his neck when looking up is interfering with his duties.     Pain Scale: Ashwin rates pain on a scale of 0-10 to be 5 currently.    Objective     Ashwin received individual therapeutic exercises to develop strength, endurance, ROM, flexibility and posture for 20 minutes including:    Seated:   - Upper trap Stretch 3 x 30 sec    - Levator Scap Stretch 3 x 30 seconds    - C- AROM in all planes within pain free range x15   - B shoulder ER with blue TB x15   - Nerve Glides x15    Supine:   - C-Retractions x15   - C- Rotation with arm raises x15 reps   - Scapular Retractions x15 (progress next session)   - Horizontal Abduction with Black TB [id number]   - Pec Stretch over half bolster 3 minutes   - Manual therapy (see below)  Sidelying:    - Open Books x15      Ashwin received the following manual therapy techniques: Myofacial release, Soft tissue Mobilization and manual cervical stretches were applied to the: upper trap, levator scap, sub occipitals for 25 minutes using sambra.  The patient received the following direct contact modalities after being cleared for contraindications: NA    The patient received the following supervised modalities after being cleared for contradictions: NA    Mechanical cervical traction was performed in 5 min increments at 12 pound pull      Written Home Exercises Provided: Upper trap, levator scap, nerve glides, cervical retractions  Pt demo good understanding of the education provided. Ashwin demonstrated good return demonstration of activities.     Education provided re: pt educated on importance of HEP and proper technique and muscle activation with all theres.  Ashwin verbalized good understanding of education provided.   No spiritual or educational barriers to learning provided    Assessment     Patient tolerated treatment good today.  Patient felt good response to manual therapy and mechanical traction but reported similar symptoms of the cervical region. Patient still presents with signs of facet restriction along with his spinal stenosis dx. Patient indicated that due to having to switch jobs, he would have to go to a clinic on the [place]. He indicates that he will have to cancel the rest of his appointments here.     This is a 58 y.o. male referred to outpatient physical therapy and presents with a medical diagnosis of the aforementioned above and demonstrates limitations as described in the problem list. Pt prognosis is Good. Pt will continue to benefit from skilled outpatient physical therapy to address the deficits listed in the problem list, provide pt/family education and to maximize pt's level of independence in the home and community environment.     Goals as follows:  Short Term Goals (6Weeks):   1. Pt will report 20% reduction in pain of the cervical spine and LUE for ease with ADL's  2. PT will demonstrate 1/3 MMT improvement in periscapular strength for ease with upright posture  3. Pt will demonstrate improved cervical spine ROM in all directions by 5 degrees for ease with driving to MD appointments     Long Term Goals (12 Weeks):   1. Pt will report being independent with HEP for maintenance of improvements gained during therapy sessions  2. PT will report 50% reduction of pain of the neck and LUE for ease with donning upper  body clothing   3. Pt will demonstrate full LUE and periscapular strength without the provocation of pain for ease with household chores  4. Pt will demonstrate appropriate upright posture without external cueing for ease with work related activities.          Plan     Certification Period: 9/24/18 to 12/24/18 (PN due by 10/24/18)    Continue with established Plan of Care towards PT goals.    Therapist: George Muir, PT  10/12/2018

## 2019-05-14 ENCOUNTER — TELEPHONE (OUTPATIENT)
Dept: FAMILY MEDICINE | Facility: CLINIC | Age: 59
End: 2019-05-14

## 2019-05-14 NOTE — TELEPHONE ENCOUNTER
----- Message from Trevorjeff Hallie sent at 5/14/2019  2:34 PM CDT -----  Contact: Self  Type:  Patient Requesting Referral    Who Called: Self    Referral to What Specialty: Neurosurgeon    Reason for Referral: Neck Pain    Does the patient want the referral with a specific physician?: Jarvis Monique [phone]    [facility] the [facility]    Would the patient rather a call back or a response via [facility]? Call     Best Call Back Number: [phone]

## 2019-05-14 NOTE — TELEPHONE ENCOUNTER
Pt wants a referral for his neck pain that's been going since last June . Pt states the pain is 6 in neck and shoulders . Also have been getting headaches.

## 2019-05-16 ENCOUNTER — TELEPHONE (OUTPATIENT)
Dept: FAMILY MEDICINE | Facility: CLINIC | Age: 59
End: 2019-05-16

## 2019-05-16 DIAGNOSIS — M47.22 OSTEOARTHRITIS OF SPINE WITH RADICULOPATHY, CERVICAL REGION: Primary | ICD-10-CM

## 2019-05-16 NOTE — TELEPHONE ENCOUNTER
Contacted pt and informed him that referral has been placed . Also informed pt of referral's team contact number.

## 2019-06-10 ENCOUNTER — TELEPHONE (OUTPATIENT)
Dept: FAMILY MEDICINE | Facility: CLINIC | Age: 59
End: 2019-06-10

## 2019-06-10 NOTE — TELEPHONE ENCOUNTER
----- Message from Rachelle Benton sent at 6/10/2019  8:14 AM CDT -----  ..Type: Patient Call Back    Who called: pt     What is the request in detail: Pt asking for a copy of MRI results for . Pt is asking for it this morning to bring to another doctor's appt.     Can the clinic reply by [facility]? No     Would the patient rather a call back or a response via [facility]? Call back     Best call back number: [phone]    Additional Information:

## 2019-06-28 DIAGNOSIS — Z12.11 COLON CANCER SCREENING: ICD-10-CM

## 2020-05-15 DIAGNOSIS — Z11.59 NEED FOR HEPATITIS C SCREENING TEST: ICD-10-CM

## 2020-07-03 DIAGNOSIS — Z12.11 COLON CANCER SCREENING: ICD-10-CM

## 2020-08-24 ENCOUNTER — OFFICE VISIT (OUTPATIENT)
Dept: URGENT CARE | Facility: CLINIC | Age: 60
End: 2020-08-24
Payer: COMMERCIAL

## 2020-08-24 VITALS
DIASTOLIC BLOOD PRESSURE: 70 MMHG | HEIGHT: 67 IN | RESPIRATION RATE: 18 BRPM | BODY MASS INDEX: 26.53 KG/M2 | TEMPERATURE: 98 F | OXYGEN SATURATION: 96 % | SYSTOLIC BLOOD PRESSURE: 128 MMHG | HEART RATE: 87 BPM | WEIGHT: 169 LBS

## 2020-08-24 DIAGNOSIS — L02.91 ABSCESS: Primary | ICD-10-CM

## 2020-08-24 PROCEDURE — 99214 PR OFFICE/OUTPT VISIT, EST, LEVL IV, 30-39 MIN: ICD-10-PCS | Mod: S$GLB,,, | Performed by: INTERNAL MEDICINE

## 2020-08-24 PROCEDURE — 99214 OFFICE O/P EST MOD 30 MIN: CPT | Mod: S$GLB,,, | Performed by: INTERNAL MEDICINE

## 2020-08-24 RX ORDER — SULFAMETHOXAZOLE AND TRIMETHOPRIM 800; 160 MG/1; MG/1
1 TABLET ORAL 2 TIMES DAILY
Qty: 20 TABLET | Refills: 0 | Status: SHIPPED | OUTPATIENT
Start: 2020-08-24 | End: 2020-09-03

## 2020-08-24 RX ORDER — MUPIROCIN 20 MG/G
OINTMENT TOPICAL 3 TIMES DAILY
Qty: 1 G | Refills: 0 | Status: SHIPPED | OUTPATIENT
Start: 2020-08-24 | End: 2020-08-29

## 2020-08-24 NOTE — PATIENT INSTRUCTIONS
Abscess (Antibiotic Treatment Only)  An abscess (sometimes called a boil) happens when bacteria get trapped under the skin and start to grow. Pus forms inside the abscess as the body responds to the bacteria. An abscess can happen with an insect bite, ingrown hair, blocked oil gland, pimple, cyst, or puncture wound.  In the early stages, your wound may be red and tender. For this stage, you may get antibiotics. If the abscess does not get better with antibiotics, it will need to be drained with a small cut.  Home care  These tips will help you care for your abscess at home:  · Soak the wound in hot water or apply hot packs (small towel soaked in hot water) to the area for 20 minutes at a time. Do this 3 to 4 times a day.  · Do not cut, squeeze, or pop the boil yourself.  · Apply antibiotic cream or ointment to the skin 3 to 4 times a day, unless something else was prescribed. Some ointments include an antibiotic plus a pain reliever.  · If your doctor prescribed antibiotics, do not stop taking them until you have finished the medicine or the doctor tells you to stop.  · You may use an over-the-counter pain medicine to control pain, unless another pain medicine was prescribed. If you have chronic liver or kidney disease or ever had a stomach ulcer or gastrointestinal bleeding, talk with your doctor before using these any of these.  Follow-up care  Follow up with your healthcare provider, or as advised. Check your wound each day for the signs of worsening infection listed below.  When to seek medical advice  Get prompt medical attention if any of these occur:  · An increase in redness or swelling  · Red streaks in the skin leading away from the abscess  · An increase in local pain or swelling  · Fever of 100.4ºF (38ºC) or higher, or as directed by your healthcare provider  · Pus or fluid coming from the abscess  · Boil returns after getting better  Date Last Reviewed: 9/1/2016  © 7857-7399 [employer] [address] [place] [id number]. All rights reserved. This information is not intended as a substitute for professional medical care. Always follow your healthcare professional's instructions.          Please take the antibiotics as prescribed twice a day for 10 days on a full stomach until you complete the entire prescription.    Apply mupirocin antibiotic cream to abscess twice a day for 5 days and keep covered while packing is in place. Please return to clinic in 3 days for recheck of your wound to ensure the abscess is improving with antibiotics and does not need to be cut and drained in addition.     Continue to apply warm compresses    If not allergic, please take over the counter Tylenol (Acetaminophen) and/or Motrin (Ibuprofen) as directed for control of pain and/or fever.      Take a shower for personal hygiene, no bath.  Do not soak wound unless specifically instructed. If this is a recurrent issue, use hibiclens three times a week as body wash to help prevent future abscess(es), let stay on skin for 5 minutes before rinsing off.        *Please return to urgent care or go to the ER for any new or worsening symptoms including but not limited to fever above 100.4 F, chills, vomiting, worsening redness/pain/swelling to area, etc.    *Please follow up with dermatology to have cyst capsule removed to prevent reoccurrence of abscess. I have placed a referral for you in our system, so they should be contacting you shortly to schedule an appointment.     Please arrange follow up with your primary medical clinic as soon as possible. You must understand that you've received an Urgent Care treatment only and that you may be released before all of your medical problems are known or treated. You, the patient, will arrange for follow up as instructed. If your symptoms worsen or fail to improve you should go to the Emergency Room.      If you smoke, please stop smoking!!

## 2020-08-24 NOTE — PROGRESS NOTES
"Subjective:       Patient ID: Ashwin Segura Jr. is a 59 y.o. male.    Vitals:  height is 5' 7" (1.702 m) and weight is 76.7 kg (169 lb). His temperature is 98.4 °F (36.9 °C). His blood pressure is 128/70 and his pulse is 87. His respiration is 18 and oxygen saturation is 96%.     Chief Complaint: Back Pain    51-year-old male with past medical history presents to urgent care complaining a painful "knot" on his mid upper back that he noticed 2 days ago.  Pain is constant, 4/10, and worse palpation.  Patient states his wife squeezed it and a little pus came out.  Patient states he also another lump right next to, but he has had that evaluated and they told it was a buildup of fat. No prior treatment attempted. Pt denies recent illness/travel, fever, chills, cough, SOB, chest pain, leg pain/swelling, N/V, abdominal pain, and headache.       Back Pain  This is a new problem. The current episode started in the past 7 days. The problem occurs constantly. The quality of the pain is described as aching. The pain does not radiate. The pain is at a severity of 4/10. The pain is mild. The pain is the same all the time. Pertinent negatives include no fever. The treatment provided no relief.       Constitution: Negative for chills and fever.   HENT: Negative for facial swelling and sore throat.    Neck: Negative for painful lymph nodes.   Eyes: Negative for eye itching and eyelid swelling.   Respiratory: Negative for cough.    Musculoskeletal: Positive for back pain. Negative for joint pain and joint swelling.   Skin: Positive for rash. Negative for color change, pale, wound, abrasion, laceration, lesion, skin thickening/induration, puncture wound, abscess, avulsion and hives.   Allergic/Immunologic: Negative for environmental allergies, immunocompromised state and hives.   Hematologic/Lymphatic: Negative for swollen lymph nodes.       Objective:      Physical Exam   Constitutional: He is oriented to person, place, and time. "  Non-toxic appearance. He does not appear ill.   HENT:   Head: Normocephalic and atraumatic.   Neck: Neck supple.   Cardiovascular: Normal rate and regular rhythm.   Pulmonary/Chest: Effort normal. No respiratory distress.   Abdominal: Normal appearance.   Musculoskeletal: Normal range of motion.        Arms:    Neurological: He is alert and oriented to person, place, and time.   Skin: Skin is warm and dry.   Nursing note and vitals reviewed.        Assessment:       1. Abscess        Plan:         Abscess  -     mupirocin (BACTROBAN) 2 % ointment; Apply topically 3 (three) times daily. for 5 days  Dispense: 1 g; Refill: 0  -     sulfamethoxazole-trimethoprim 800-160mg (BACTRIM DS) 800-160 mg Tab; Take 1 tablet by mouth 2 (two) times daily. for 10 days  Dispense: 20 tablet; Refill: 0  -     Ambulatory referral/consult to Dermatology    Discussed results/diagnosis/plan with patient in clinic. Answered all of patient's questions and concerns. Patient was given strict ED instructions. Patient verbally understood and agreed with treatment plan.       Patient Instructions       Abscess (Antibiotic Treatment Only)  An abscess (sometimes called a boil) happens when bacteria get trapped under the skin and start to grow. Pus forms inside the abscess as the body responds to the bacteria. An abscess can happen with an insect bite, ingrown hair, blocked oil gland, pimple, cyst, or puncture wound.  In the early stages, your wound may be red and tender. For this stage, you may get antibiotics. If the abscess does not get better with antibiotics, it will need to be drained with a small cut.  Home care  These tips will help you care for your abscess at home:  · Soak the wound in hot water or apply hot packs (small towel soaked in hot water) to the area for 20 minutes at a time. Do this 3 to 4 times a day.  · Do not cut, squeeze, or pop the boil yourself.  · Apply antibiotic cream or ointment to the skin 3 to 4 times a day, unless  something else was prescribed. Some ointments include an antibiotic plus a pain reliever.  · If your doctor prescribed antibiotics, do not stop taking them until you have finished the medicine or the doctor tells you to stop.  · You may use an over-the-counter pain medicine to control pain, unless another pain medicine was prescribed. If you have chronic liver or kidney disease or ever had a stomach ulcer or gastrointestinal bleeding, talk with your doctor before using these any of these.  Follow-up care  Follow up with your healthcare provider, or as advised. Check your wound each day for the signs of worsening infection listed below.  When to seek medical advice  Get prompt medical attention if any of these occur:  · An increase in redness or swelling  · Red streaks in the skin leading away from the abscess  · An increase in local pain or swelling  · Fever of 100.4ºF (38ºC) or higher, or as directed by your healthcare provider  · Pus or fluid coming from the abscess  · Boil returns after getting better  Date Last Reviewed: 9/1/2016  © 4449-2747 [employer]. [address] [place] [address]. All rights reserved. This information is not intended as a substitute for professional medical care. Always follow your healthcare professional's instructions.          Please take the antibiotics as prescribed twice a day for 10 days on a full stomach until you complete the entire prescription.    Apply mupirocin antibiotic cream to abscess twice a day for 5 days and keep covered while packing is in place. Please return to clinic in 3 days for recheck of your wound to ensure the abscess is improving with antibiotics and does not need to be cut and drained in addition.     Continue to apply warm compresses    If not allergic, please take over the counter Tylenol (Acetaminophen) and/or Motrin (Ibuprofen) as directed for control of pain and/or fever.      Take a shower for personal hygiene, no bath.  Do not  soak wound unless specifically instructed. If this is a recurrent issue, use hibiclens three times a week as body wash to help prevent future abscess(es), let stay on skin for 5 minutes before rinsing off.        *Please return to urgent care or go to the ER for any new or worsening symptoms including but not limited to fever above 100.4 F, chills, vomiting, worsening redness/pain/swelling to area, etc.    *Please follow up with dermatology to have cyst capsule removed to prevent reoccurrence of abscess. I have placed a referral for you in our system, so they should be contacting you shortly to schedule an appointment.     Please arrange follow up with your primary medical clinic as soon as possible. You must understand that you've received an Urgent Care treatment only and that you may be released before all of your medical problems are known or treated. You, the patient, will arrange for follow up as instructed. If your symptoms worsen or fail to improve you should go to the Emergency Room.      If you smoke, please stop smoking!!

## 2020-10-05 ENCOUNTER — PATIENT MESSAGE (OUTPATIENT)
Dept: ADMINISTRATIVE | Facility: HOSPITAL | Age: 60
End: 2020-10-05

## 2021-01-04 ENCOUNTER — PATIENT MESSAGE (OUTPATIENT)
Dept: ADMINISTRATIVE | Facility: HOSPITAL | Age: 61
End: 2021-01-04

## 2021-04-06 ENCOUNTER — PATIENT MESSAGE (OUTPATIENT)
Dept: ADMINISTRATIVE | Facility: HOSPITAL | Age: 61
End: 2021-04-06

## 2021-04-16 ENCOUNTER — PATIENT MESSAGE (OUTPATIENT)
Dept: RESEARCH | Facility: HOSPITAL | Age: 61
End: 2021-04-16

## 2021-07-07 ENCOUNTER — PATIENT MESSAGE (OUTPATIENT)
Dept: ADMINISTRATIVE | Facility: HOSPITAL | Age: 61
End: 2021-07-07

## 2022-04-08 ENCOUNTER — OFFICE VISIT (OUTPATIENT)
Dept: GASTROENTEROLOGY | Facility: CLINIC | Age: 62
End: 2022-04-08
Payer: COMMERCIAL

## 2022-04-08 ENCOUNTER — LAB VISIT (OUTPATIENT)
Dept: LAB | Facility: HOSPITAL | Age: 62
End: 2022-04-08
Attending: NURSE PRACTITIONER
Payer: COMMERCIAL

## 2022-04-08 ENCOUNTER — OFFICE VISIT (OUTPATIENT)
Dept: FAMILY MEDICINE | Facility: CLINIC | Age: 62
End: 2022-04-08
Payer: COMMERCIAL

## 2022-04-08 VITALS
HEART RATE: 86 BPM | DIASTOLIC BLOOD PRESSURE: 75 MMHG | WEIGHT: 172.5 LBS | BODY MASS INDEX: 27.07 KG/M2 | SYSTOLIC BLOOD PRESSURE: 121 MMHG | HEIGHT: 67 IN

## 2022-04-08 DIAGNOSIS — K92.1 MELENA: ICD-10-CM

## 2022-04-08 DIAGNOSIS — Z12.11 COLON CANCER SCREENING: Primary | ICD-10-CM

## 2022-04-08 DIAGNOSIS — K92.2 GASTROINTESTINAL HEMORRHAGE, UNSPECIFIED GASTROINTESTINAL HEMORRHAGE TYPE: Primary | ICD-10-CM

## 2022-04-08 DIAGNOSIS — Z87.11 HISTORY OF PEPTIC ULCER DISEASE: ICD-10-CM

## 2022-04-08 DIAGNOSIS — K92.2 GASTROINTESTINAL HEMORRHAGE, UNSPECIFIED GASTROINTESTINAL HEMORRHAGE TYPE: ICD-10-CM

## 2022-04-08 LAB
ALBUMIN SERPL BCP-MCNC: 3.7 G/DL (ref 3.5–5.2)
ALP SERPL-CCNC: 70 U/L (ref 55–135)
ALT SERPL W/O P-5'-P-CCNC: 17 U/L (ref 10–44)
ANION GAP SERPL CALC-SCNC: 7 MMOL/L (ref 8–16)
AST SERPL-CCNC: 21 U/L (ref 10–40)
BASOPHILS # BLD AUTO: 0.15 K/UL (ref 0–0.2)
BASOPHILS NFR BLD: 2 % (ref 0–1.9)
BILIRUB SERPL-MCNC: 0.4 MG/DL (ref 0.1–1)
BUN SERPL-MCNC: 18 MG/DL (ref 6–20)
CALCIUM SERPL-MCNC: 9.4 MG/DL (ref 8.7–10.5)
CHLORIDE SERPL-SCNC: 106 MMOL/L (ref 95–110)
CO2 SERPL-SCNC: 29 MMOL/L (ref 23–29)
CREAT SERPL-MCNC: 1.5 MG/DL (ref 0.5–1.4)
DIFFERENTIAL METHOD: ABNORMAL
EOSINOPHIL # BLD AUTO: 0.1 K/UL (ref 0–0.5)
EOSINOPHIL NFR BLD: 1.5 % (ref 0–8)
ERYTHROCYTE [DISTWIDTH] IN BLOOD BY AUTOMATED COUNT: 15.1 % (ref 11.5–14.5)
EST. GFR  (AFRICAN AMERICAN): 58 ML/MIN/1.73 M^2
EST. GFR  (NON AFRICAN AMERICAN): 50 ML/MIN/1.73 M^2
GLUCOSE SERPL-MCNC: 106 MG/DL (ref 70–110)
HCT VFR BLD AUTO: 47.8 % (ref 40–54)
HGB BLD-MCNC: 15.1 G/DL (ref 14–18)
IMM GRANULOCYTES # BLD AUTO: 0.01 K/UL (ref 0–0.04)
IMM GRANULOCYTES NFR BLD AUTO: 0.1 % (ref 0–0.5)
LYMPHOCYTES # BLD AUTO: 2.2 K/UL (ref 1–4.8)
LYMPHOCYTES NFR BLD: 30 % (ref 18–48)
MCH RBC QN AUTO: 28.1 PG (ref 27–31)
MCHC RBC AUTO-ENTMCNC: 31.6 G/DL (ref 32–36)
MCV RBC AUTO: 89 FL (ref 82–98)
MONOCYTES # BLD AUTO: 0.8 K/UL (ref 0.3–1)
MONOCYTES NFR BLD: 10.8 % (ref 4–15)
NEUTROPHILS # BLD AUTO: 4.2 K/UL (ref 1.8–7.7)
NEUTROPHILS NFR BLD: 55.6 % (ref 38–73)
NRBC BLD-RTO: 0 /100 WBC
PLATELET # BLD AUTO: 283 K/UL (ref 150–450)
PMV BLD AUTO: 9.9 FL (ref 9.2–12.9)
POTASSIUM SERPL-SCNC: 5 MMOL/L (ref 3.5–5.1)
PROT SERPL-MCNC: 6.8 G/DL (ref 6–8.4)
RBC # BLD AUTO: 5.38 M/UL (ref 4.6–6.2)
SODIUM SERPL-SCNC: 142 MMOL/L (ref 136–145)
WBC # BLD AUTO: 7.47 K/UL (ref 3.9–12.7)

## 2022-04-08 PROCEDURE — 99999 PR PBB SHADOW E&M-EST. PATIENT-LVL IV: ICD-10-PCS | Mod: PBBFAC,,, | Performed by: INTERNAL MEDICINE

## 2022-04-08 PROCEDURE — 3078F DIAST BP <80 MM HG: CPT | Mod: CPTII,S$GLB,, | Performed by: INTERNAL MEDICINE

## 2022-04-08 PROCEDURE — 99204 OFFICE O/P NEW MOD 45 MIN: CPT | Mod: S$GLB,,, | Performed by: INTERNAL MEDICINE

## 2022-04-08 PROCEDURE — 99214 OFFICE O/P EST MOD 30 MIN: CPT | Mod: 95,,, | Performed by: NURSE PRACTITIONER

## 2022-04-08 PROCEDURE — 3074F PR MOST RECENT SYSTOLIC BLOOD PRESSURE < 130 MM HG: ICD-10-PCS | Mod: CPTII,S$GLB,, | Performed by: INTERNAL MEDICINE

## 2022-04-08 PROCEDURE — 85025 COMPLETE CBC W/AUTO DIFF WBC: CPT | Performed by: NURSE PRACTITIONER

## 2022-04-08 PROCEDURE — 1159F PR MEDICATION LIST DOCUMENTED IN MEDICAL RECORD: ICD-10-PCS | Mod: CPTII,S$GLB,, | Performed by: INTERNAL MEDICINE

## 2022-04-08 PROCEDURE — 1159F MED LIST DOCD IN RCRD: CPT | Mod: CPTII,S$GLB,, | Performed by: INTERNAL MEDICINE

## 2022-04-08 PROCEDURE — 3008F PR BODY MASS INDEX (BMI) DOCUMENTED: ICD-10-PCS | Mod: CPTII,S$GLB,, | Performed by: INTERNAL MEDICINE

## 2022-04-08 PROCEDURE — 1160F RVW MEDS BY RX/DR IN RCRD: CPT | Mod: CPTII,S$GLB,, | Performed by: INTERNAL MEDICINE

## 2022-04-08 PROCEDURE — 99204 PR OFFICE/OUTPT VISIT, NEW, LEVL IV, 45-59 MIN: ICD-10-PCS | Mod: S$GLB,,, | Performed by: INTERNAL MEDICINE

## 2022-04-08 PROCEDURE — 3078F PR MOST RECENT DIASTOLIC BLOOD PRESSURE < 80 MM HG: ICD-10-PCS | Mod: CPTII,S$GLB,, | Performed by: INTERNAL MEDICINE

## 2022-04-08 PROCEDURE — 3074F SYST BP LT 130 MM HG: CPT | Mod: CPTII,S$GLB,, | Performed by: INTERNAL MEDICINE

## 2022-04-08 PROCEDURE — 1160F PR REVIEW ALL MEDS BY PRESCRIBER/CLIN PHARMACIST DOCUMENTED: ICD-10-PCS | Mod: CPTII,S$GLB,, | Performed by: INTERNAL MEDICINE

## 2022-04-08 PROCEDURE — 99214 PR OFFICE/OUTPT VISIT, EST, LEVL IV, 30-39 MIN: ICD-10-PCS | Mod: 95,,, | Performed by: NURSE PRACTITIONER

## 2022-04-08 PROCEDURE — 99999 PR PBB SHADOW E&M-EST. PATIENT-LVL IV: CPT | Mod: PBBFAC,,, | Performed by: INTERNAL MEDICINE

## 2022-04-08 PROCEDURE — 80053 COMPREHEN METABOLIC PANEL: CPT | Performed by: NURSE PRACTITIONER

## 2022-04-08 PROCEDURE — 36415 COLL VENOUS BLD VENIPUNCTURE: CPT | Performed by: NURSE PRACTITIONER

## 2022-04-08 PROCEDURE — 3008F BODY MASS INDEX DOCD: CPT | Mod: CPTII,S$GLB,, | Performed by: INTERNAL MEDICINE

## 2022-04-08 RX ORDER — OMEPRAZOLE 40 MG/1
40 CAPSULE, DELAYED RELEASE ORAL DAILY
Qty: 30 CAPSULE | Refills: 1 | Status: SHIPPED | OUTPATIENT
Start: 2022-04-08 | End: 2023-04-08

## 2022-04-08 NOTE — PROGRESS NOTES
Answers for HPI/ROS submitted by the patient on 4/8/2022  Chronicity: recurrent  Onset: in the past 7 days  Onset quality: gradual  Frequency: 2 to 4 times per day  Episode duration: 1 hours  Progression since onset: waxing and waning  Pain location: epigastric region  Pain - numeric: 2/10  Pain quality: cramping, a sensation of fullness  anorexia: No  arthralgias: No  belching: Yes  constipation: No  diarrhea: Yes  dysuria: No  fever: No  flatus: Yes  frequency: No  headaches: No  hematochezia: No  hematuria: No  melena: Yes  myalgias: No  nausea: No  weight loss: No  vomiting: No  Aggravated by: eating  Relieved by: belching  Pain severity: mild  Treatments tried: antacids, antibiotics, proton pump inhibitors  Improvement on treatment: moderate  abdominal surgery: No  colon cancer: No  Crohn's disease: No  gallstones: No  GERD: Yes  irritable bowel syndrome: No  kidney stones: Yes  pancreatitis: No  PUD: Yes  ulcerative colitis: No  UTI: No    The patient location is: [place], [place]  The chief complaint leading to consultation is: Melena    Visit type: audiovisual    Face to Face time with patient: 17 minutes  30 minutes of total time spent on the encounter, which includes face to face time and non-face to face time preparing to see the patient (eg, review of tests), Obtaining and/or reviewing separately obtained history, Documenting clinical information in the electronic or other health record, Independently interpreting results (not separately reported) and communicating results to the patient/family/caregiver, or Care coordination (not separately reported).         Each patient to whom he or she provides medical services by telemedicine is:  (1) informed of the relationship between the physician and patient and the respective role of any other health care provider with respect to management of the patient; and (2) notified that he or she may decline to receive medical services by telemedicine and may withdraw from  such care at any time.    Notes:   Routine Office Visit    Patient Name: Ashwin Segura Jr.    : 1960  MRN: [id number]    Chief Complaint:  Melena    Subjective:  Ashwin is a 61 y.o. male who presents today for:    1. Melena - patient who is new to me reports today for evaluation.    In  he was diagnosed with bleeding peptic ulcers and required hospitalization needing IV proton pump inhibitors and antibiotics.  At that time he had melena and thick tarry stool with lightheadedness and orthostatic hypotension.  He states he was treated at the hospital and since then, he has had no problems until the last few days. 3-4 days ago he started to develop slight abdominal cramps which he describes as a feeling of gas and bloating.  He also noted a darker discoloration of his stool as well as a thick tarry stool.  He started taking Prilosec 20 mg over-the-counter daily and the abdominal symptoms improved and he reports that his stool is not thick anymore but is still slightly dark.  He is feeling the best today that he has felt in the last few days.  He did have some lightheadedness a few days ago but this has resolved.  Denies any chest pain, shortness of breath, wheezing, or palpitations today.  He does deal with occasional heartburn and reflux.  No other acute concerns.    Past Medical History  Past Medical History:   Diagnosis Date    Muscle tightness 2018       Past Surgical History  Past Surgical History:   Procedure Laterality Date    APPENDECTOMY         Family History  Family History   Problem Relation Age of Onset    Cancer Mother     Heart disease Father        Social History  Social History     Socioeconomic History    Marital status:    Tobacco Use    Smoking status: Never Smoker    Smokeless tobacco: Never Used   Substance and Sexual Activity    Alcohol use: Yes    Drug use: No    Sexual activity: Yes       Current Medications  Current Outpatient Medications on File Prior to  Visit   Medication Sig Dispense Refill    hydrocortisone 2.5 % cream Apply topically 2 (two) times daily. for 10 days 20 g 0    lidocaine (XYLOCAINE) 5 % Oint ointment Apply topically as needed. 30 g 0    meloxicam (MOBIC) 7.5 MG tablet Take 1 tablet (7.5 mg total) by mouth once daily. (Patient not taking: Reported on 8/24/2020) 15 tablet 0    oxyCODONE-acetaminophen (PERCOCET) 5-325 mg per tablet Take 1 tablet by mouth every 12 (twelve) hours as needed for Pain. (Patient not taking: Reported on 8/24/2020) 7 tablet 0    sildenafil (REVATIO) 20 mg Tab TAKE 1 TO 3 TABLETS BY MOUTH ONE HOUR PRIOR TO RELATIONS  2     No current facility-administered medications on file prior to visit.       Allergies   Review of patient's allergies indicates:  No Known Allergies    Review of Systems (Pertinent positives)  Review of Systems   Constitutional: Negative for chills, diaphoresis, fever, malaise/fatigue and weight loss.   HENT: Negative.    Eyes: Negative.    Respiratory: Negative.    Cardiovascular: Negative for chest pain, palpitations, orthopnea, claudication, leg swelling and PND.   Gastrointestinal: Positive for diarrhea, heartburn and melena. Negative for abdominal pain, blood in stool, constipation, nausea and vomiting.   Genitourinary: Negative for dysuria, frequency and hematuria.   Musculoskeletal: Negative for myalgias.   Skin: Negative.    Neurological: Negative.  Negative for dizziness, tingling, tremors and headaches.   Endo/Heme/Allergies: Negative.    Psychiatric/Behavioral: Negative.        There were no vitals taken for this visit.    Physical Exam  Vitals reviewed.   Constitutional:       General: He is not in acute distress.     Appearance: Normal appearance. He is not toxic-appearing.   Pulmonary:      Effort: Pulmonary effort is normal.      Breath sounds: No wheezing.      Comments: Effort normal no accessory muscle usage  Neurological:      General: No focal deficit present.      Mental Status: He  is alert and oriented to person, place, and time.   Psychiatric:         Mood and Affect: Mood normal.         Behavior: Behavior normal.          Assessment/Plan:  Ashwin Segura Jr. is a 61 y.o. male who presents today for :    Diagnoses and all orders for this visit:    Gastrointestinal hemorrhage, unspecified gastrointestinal hemorrhage type  -     Ambulatory referral/consult to Gastroenterology; Future  -     CBC Auto Differential; Future  -     Comprehensive Metabolic Panel; Future  -     omeprazole (PRILOSEC) 40 MG capsule; Take 1 capsule (40 mg total) by mouth once daily.    History of peptic ulcer disease  -     omeprazole (PRILOSEC) 40 MG capsule; Take 1 capsule (40 mg total) by mouth once daily.    Melena  -     Ambulatory referral/consult to Gastroenterology; Future    Long discussion with patient regarding his symptoms.  He states his symptoms are improving.  He is feeling well today without any chest pain, dizziness, lightheadedness, or other cardiac/respiratory/neurologic symptoms.  I will increase the Prilosec dose to 40 mg. Check labs today.  Stat referral to Gastroenterology placed.  Recommended patient go to the emergency room in the meantime if he develops worsening symptoms, chest pain, lightheadedness, or shortness of breath.  All questions answered.  Patient verbalized understanding of instructions.        This office note has been dictated.  This dictation has been generated using M[employer] Fluency Direct dictation; some phonetic errors may occur.   My collaborating physician is Dr. Darin Alejo.

## 2022-04-08 NOTE — PROGRESS NOTES
"  [facility] Note    Referral from Pierce Enriquez NP    CC: dark stools    HPI 61 y.o. male with past medical history of PUD who presents with several days of acute onset, painless, dark tarry stool with associated fatigue.  He had mild abdominal cramping.  He experienced these symptoms previously in 2020 and was found to have a gastric ulcer on EGD.    He started Prilosec OTC and took a few doses of Amoxicillin and has started to feel better.    He is tolerating oral intake including eggs, toast and oatmeal.    His stool is starting to firm up but is still dark.    He reports rare NSAID use for joint pains.    He has never had a colonoscopy.    Past Medical History  Past Medical History:   Diagnosis Date    Muscle tightness 8/6/2018     Past Surgical History  Appendectomy    Family History  No pertinent family history of colon cancer    No significant tobacco, alcohol or drug use    No blood thinners    Review of Systems  General ROS: negative for chills, fever or weight loss  Cardiovascular ROS: no chest pain or dyspnea on exertion  Gastrointestinal ROS: no nausea, vomiting, dysphagia or hematochezia    Physical Examination  /75   Pulse 86   Ht 5' 7" (1.702 m)   Wt 78.3 kg (172 lb 8.2 oz)   BMI 27.02 kg/m²   General appearance: alert, cooperative, no distress  HENT: Normocephalic, atraumatic, neck symmetrical, no nasal discharge   Lungs: clear to auscultation bilaterally, no dullness to percussion bilaterally  Heart: regular rate and rhythm without rub; no displacement of the PMI   Abdomen: soft, non-tender; bowel sounds normoactive; no organomegaly  Extremities: extremities symmetric; no clubbing, cyanosis, or edema  Neurologic: Alert and oriented X 3, normal strength, normal coordination and gait    Labs:  8/10/20 Surgical path from EGD at     GASTRIC BIOPSY:    - MINIMAL CHRONIC INFLAMMATION; NEGATIVE FOR HELICOBACTER.. "   _______________________________________________________________________   SPECIMEN AND SOURCE:   Stomach antrum and body biopsy - rule out H. Pylori         Assessment:   The patient is a 62 yo man with a past medical history of PUD and a GI bleed in 2020 who presents with new onset melena.  He has started an OTC PPI and is feeling better.  He followed up in primary care today and started Omeprazole 40mg daily.  He has never had an EGD or colonoscopy.    Plan:  -Go to the lab today for labs ordered by NP Zoe-CBC, CMP  -Plan for EGD and colonoscopy for further evaluation-1st available appt requested.  -Avoid NSAIDS.  -If the patient has worsening of his symptoms then he should present to the ED for further evaluation.    Asiya Vega MD

## 2022-04-11 ENCOUNTER — TELEPHONE (OUTPATIENT)
Dept: ENDOSCOPY | Facility: HOSPITAL | Age: 62
End: 2022-04-11
Payer: COMMERCIAL

## 2022-04-11 ENCOUNTER — PATIENT MESSAGE (OUTPATIENT)
Dept: FAMILY MEDICINE | Facility: CLINIC | Age: 62
End: 2022-04-11
Payer: COMMERCIAL

## 2022-04-11 NOTE — TELEPHONE ENCOUNTER
Contacted patient in regards to scheduling and EGD and Colonoscopy. No answer. LVM for patient to call back on my direct line [phone].

## 2022-04-12 ENCOUNTER — TELEPHONE (OUTPATIENT)
Dept: ENDOSCOPY | Facility: HOSPITAL | Age: 62
End: 2022-04-12
Payer: COMMERCIAL

## 2022-04-12 ENCOUNTER — PATIENT MESSAGE (OUTPATIENT)
Dept: ENDOSCOPY | Facility: HOSPITAL | Age: 62
End: 2022-04-12
Payer: COMMERCIAL

## 2022-04-12 NOTE — TELEPHONE ENCOUNTER
Contacted patient in regards to scheduling and EGD and Colonoscopy. No answer. LVM for patient to call back on my direct line [phone].

## 2022-04-13 ENCOUNTER — TELEPHONE (OUTPATIENT)
Dept: ENDOSCOPY | Facility: HOSPITAL | Age: 62
End: 2022-04-13
Payer: COMMERCIAL

## 2022-04-13 NOTE — TELEPHONE ENCOUNTER
Good Morning,    I have made several attempts to reach out to patient to get him scheduled for EGD and Colonoscopy. I have left several voicemail messages with no reply. I have also left a voicemail message for his wife and I have sent a message to him via [employer]. I will also send out an Unable to reach you letter.      Thanks,    DAYAN Haq   no

## 2022-04-13 NOTE — TELEPHONE ENCOUNTER
Contacted patient in regards to scheduling and EGD and Colonoscopy. No answer. LVM for patient to call back on my direct line [phone].

## 2022-04-18 ENCOUNTER — TELEPHONE (OUTPATIENT)
Dept: FAMILY MEDICINE | Facility: CLINIC | Age: 62
End: 2022-04-18
Payer: COMMERCIAL

## 2022-04-18 NOTE — TELEPHONE ENCOUNTER
----- Message from Pierce Enriquez NP sent at 4/15/2022  3:40 PM CDT -----  Please call patient about his labs.  It shows decreased kidney function.  I am not sure if this is due to dehydration or if this is a chronic longstanding issue.  Please have patient follow up with PCP or me in 1-2 weeks.  He should drink lots of water in the meantime between now and when he is seen again.  Thank you

## 2022-04-20 ENCOUNTER — TELEPHONE (OUTPATIENT)
Dept: FAMILY MEDICINE | Facility: CLINIC | Age: 62
End: 2022-04-20
Payer: COMMERCIAL

## 2022-05-06 ENCOUNTER — TELEPHONE (OUTPATIENT)
Dept: FAMILY MEDICINE | Facility: CLINIC | Age: 62
End: 2022-05-06
Payer: COMMERCIAL

## 2022-05-06 NOTE — TELEPHONE ENCOUNTER
LVM for patient to contact office.   ----- Message from Pierce Enriquez NP sent at 5/6/2022  1:48 PM CDT -----  Please call patient about his labs.  It shows decreased kidney function.  I am not sure if this is due to dehydration or if this is a chronic longstanding issue.  Please have patient follow up with PCP or me in 1-2 weeks.  He should drink lots of water in the meantime between now and when he is seen again.  Thank you

## 2022-06-07 ENCOUNTER — TELEPHONE (OUTPATIENT)
Dept: FAMILY MEDICINE | Facility: CLINIC | Age: 62
End: 2022-06-07
Payer: COMMERCIAL

## 2022-06-07 NOTE — TELEPHONE ENCOUNTER
Letter sent  ----- Message from Pierce Enriquez NP sent at 6/7/2022  8:04 AM CDT -----  Please send letter to patient that he has results that he needs to come in to discuss.  Thank you

## 2022-11-10 DIAGNOSIS — Z12.11 COLON CANCER SCREENING: ICD-10-CM

## 2022-11-10 DIAGNOSIS — K92.1 MELENA: Primary | ICD-10-CM
